# Patient Record
Sex: MALE | Race: BLACK OR AFRICAN AMERICAN | NOT HISPANIC OR LATINO | URBAN - METROPOLITAN AREA
[De-identification: names, ages, dates, MRNs, and addresses within clinical notes are randomized per-mention and may not be internally consistent; named-entity substitution may affect disease eponyms.]

---

## 2017-10-09 ENCOUNTER — INPATIENT (INPATIENT)
Facility: HOSPITAL | Age: 52
LOS: 0 days | Discharge: LEFT BEFORE DISCHARGE | DRG: 439 | End: 2017-10-10
Attending: INTERNAL MEDICINE | Admitting: INTERNAL MEDICINE
Payer: SELF-PAY

## 2017-10-09 VITALS
HEART RATE: 73 BPM | WEIGHT: 164.91 LBS | RESPIRATION RATE: 17 BRPM | HEIGHT: 70 IN | SYSTOLIC BLOOD PRESSURE: 131 MMHG | TEMPERATURE: 99 F | OXYGEN SATURATION: 98 % | DIASTOLIC BLOOD PRESSURE: 70 MMHG

## 2017-10-09 DIAGNOSIS — R74.8 ABNORMAL LEVELS OF OTHER SERUM ENZYMES: ICD-10-CM

## 2017-10-09 DIAGNOSIS — R79.89 OTHER SPECIFIED ABNORMAL FINDINGS OF BLOOD CHEMISTRY: ICD-10-CM

## 2017-10-09 DIAGNOSIS — Z98.890 OTHER SPECIFIED POSTPROCEDURAL STATES: Chronic | ICD-10-CM

## 2017-10-09 DIAGNOSIS — R10.13 EPIGASTRIC PAIN: ICD-10-CM

## 2017-10-09 DIAGNOSIS — E83.51 HYPOCALCEMIA: ICD-10-CM

## 2017-10-09 DIAGNOSIS — R63.8 OTHER SYMPTOMS AND SIGNS CONCERNING FOOD AND FLUID INTAKE: ICD-10-CM

## 2017-10-09 DIAGNOSIS — E13.22 OTHER SPECIFIED DIABETES MELLITUS WITH DIABETIC CHRONIC KIDNEY DISEASE: ICD-10-CM

## 2017-10-09 DIAGNOSIS — Z79.4 LONG TERM (CURRENT) USE OF INSULIN: ICD-10-CM

## 2017-10-09 DIAGNOSIS — Z29.9 ENCOUNTER FOR PROPHYLACTIC MEASURES, UNSPECIFIED: ICD-10-CM

## 2017-10-09 DIAGNOSIS — R00.1 BRADYCARDIA, UNSPECIFIED: ICD-10-CM

## 2017-10-09 DIAGNOSIS — D64.9 ANEMIA, UNSPECIFIED: ICD-10-CM

## 2017-10-09 DIAGNOSIS — K92.0 HEMATEMESIS: ICD-10-CM

## 2017-10-09 LAB
ALBUMIN SERPL ELPH-MCNC: 3.7 G/DL — SIGNIFICANT CHANGE UP (ref 3.3–5)
ALP SERPL-CCNC: 287 U/L — HIGH (ref 40–120)
ALT FLD-CCNC: 23 U/L — SIGNIFICANT CHANGE UP (ref 10–45)
AMYLASE P1 CFR SERPL: 89 U/L — SIGNIFICANT CHANGE UP (ref 25–125)
ANION GAP SERPL CALC-SCNC: 18 MMOL/L — HIGH (ref 5–17)
AST SERPL-CCNC: 27 U/L — SIGNIFICANT CHANGE UP (ref 10–40)
BILIRUB SERPL-MCNC: 0.2 MG/DL — SIGNIFICANT CHANGE UP (ref 0.2–1.2)
BUN SERPL-MCNC: 32 MG/DL — HIGH (ref 7–23)
CALCIUM SERPL-MCNC: 7.1 MG/DL — LOW (ref 8.4–10.5)
CHLORIDE SERPL-SCNC: 100 MMOL/L — SIGNIFICANT CHANGE UP (ref 96–108)
CK SERPL-CCNC: 577 U/L — HIGH (ref 30–200)
CO2 SERPL-SCNC: 17 MMOL/L — LOW (ref 22–31)
CREAT SERPL-MCNC: 5.15 MG/DL — HIGH (ref 0.5–1.3)
GLUCOSE SERPL-MCNC: 208 MG/DL — HIGH (ref 70–99)
HCT VFR BLD CALC: 25.7 % — LOW (ref 39–50)
HGB BLD-MCNC: 8.5 G/DL — LOW (ref 13–17)
LIDOCAIN IGE QN: 6 U/L — LOW (ref 7–60)
MCHC RBC-ENTMCNC: 28.3 PG — SIGNIFICANT CHANGE UP (ref 27–34)
MCHC RBC-ENTMCNC: 33.1 G/DL — SIGNIFICANT CHANGE UP (ref 32–36)
MCV RBC AUTO: 85.7 FL — SIGNIFICANT CHANGE UP (ref 80–100)
PHOSPHATE SERPL-MCNC: 5.7 MG/DL — HIGH (ref 2.5–4.5)
PLATELET # BLD AUTO: 151 K/UL — SIGNIFICANT CHANGE UP (ref 150–400)
POTASSIUM SERPL-MCNC: 5 MMOL/L — SIGNIFICANT CHANGE UP (ref 3.5–5.3)
POTASSIUM SERPL-SCNC: 5 MMOL/L — SIGNIFICANT CHANGE UP (ref 3.5–5.3)
PROT SERPL-MCNC: 7 G/DL — SIGNIFICANT CHANGE UP (ref 6–8.3)
RBC # BLD: 3 M/UL — LOW (ref 4.2–5.8)
RBC # FLD: 12.6 % — SIGNIFICANT CHANGE UP (ref 10.3–16.9)
SODIUM SERPL-SCNC: 135 MMOL/L — SIGNIFICANT CHANGE UP (ref 135–145)
WBC # BLD: 5.3 K/UL — SIGNIFICANT CHANGE UP (ref 3.8–10.5)
WBC # FLD AUTO: 5.3 K/UL — SIGNIFICANT CHANGE UP (ref 3.8–10.5)

## 2017-10-09 PROCEDURE — 71020: CPT | Mod: 26

## 2017-10-09 PROCEDURE — 93010 ELECTROCARDIOGRAM REPORT: CPT

## 2017-10-09 PROCEDURE — 99285 EMERGENCY DEPT VISIT HI MDM: CPT | Mod: 25

## 2017-10-09 PROCEDURE — 99223 1ST HOSP IP/OBS HIGH 75: CPT | Mod: GC

## 2017-10-09 PROCEDURE — 99252 IP/OBS CONSLTJ NEW/EST SF 35: CPT | Mod: GC

## 2017-10-09 PROCEDURE — 74176 CT ABD & PELVIS W/O CONTRAST: CPT | Mod: 26

## 2017-10-09 RX ORDER — CALCIUM CARBONATE 500(1250)
0 TABLET ORAL
Qty: 0 | Refills: 0 | COMMUNITY

## 2017-10-09 RX ORDER — SODIUM CHLORIDE 9 MG/ML
1000 INJECTION INTRAMUSCULAR; INTRAVENOUS; SUBCUTANEOUS ONCE
Qty: 0 | Refills: 0 | Status: COMPLETED | OUTPATIENT
Start: 2017-10-09 | End: 2017-10-09

## 2017-10-09 RX ORDER — SODIUM CHLORIDE 9 MG/ML
1000 INJECTION INTRAMUSCULAR; INTRAVENOUS; SUBCUTANEOUS
Qty: 0 | Refills: 0 | Status: DISCONTINUED | OUTPATIENT
Start: 2017-10-09 | End: 2017-10-09

## 2017-10-09 RX ORDER — INSULIN GLARGINE 100 [IU]/ML
0 INJECTION, SOLUTION SUBCUTANEOUS
Qty: 0 | Refills: 0 | COMMUNITY

## 2017-10-09 RX ORDER — SEVELAMER CARBONATE 2400 MG/1
800 POWDER, FOR SUSPENSION ORAL
Qty: 0 | Refills: 0 | Status: DISCONTINUED | OUTPATIENT
Start: 2017-10-09 | End: 2017-10-10

## 2017-10-09 RX ORDER — PANTOPRAZOLE SODIUM 20 MG/1
40 TABLET, DELAYED RELEASE ORAL EVERY 12 HOURS
Qty: 0 | Refills: 0 | Status: DISCONTINUED | OUTPATIENT
Start: 2017-10-09 | End: 2017-10-10

## 2017-10-09 RX ORDER — ACETAMINOPHEN 500 MG
1000 TABLET ORAL ONCE
Qty: 0 | Refills: 0 | Status: COMPLETED | OUTPATIENT
Start: 2017-10-09 | End: 2017-10-09

## 2017-10-09 RX ORDER — INSULIN LISPRO 100/ML
VIAL (ML) SUBCUTANEOUS
Qty: 0 | Refills: 0 | Status: DISCONTINUED | OUTPATIENT
Start: 2017-10-09 | End: 2017-10-10

## 2017-10-09 RX ORDER — INSULIN GLARGINE 100 [IU]/ML
5 INJECTION, SOLUTION SUBCUTANEOUS AT BEDTIME
Qty: 0 | Refills: 0 | Status: DISCONTINUED | OUTPATIENT
Start: 2017-10-09 | End: 2017-10-10

## 2017-10-09 RX ORDER — LIPASE/PROTEASE/AMYLASE 16-48-48K
3 CAPSULE,DELAYED RELEASE (ENTERIC COATED) ORAL
Qty: 0 | Refills: 0 | Status: DISCONTINUED | OUTPATIENT
Start: 2017-10-09 | End: 2017-10-10

## 2017-10-09 RX ORDER — INSULIN GLARGINE 100 [IU]/ML
12 INJECTION, SOLUTION SUBCUTANEOUS
Qty: 0 | Refills: 0 | COMMUNITY

## 2017-10-09 RX ORDER — SODIUM CHLORIDE 9 MG/ML
1000 INJECTION, SOLUTION INTRAVENOUS
Qty: 0 | Refills: 0 | Status: DISCONTINUED | OUTPATIENT
Start: 2017-10-09 | End: 2017-10-10

## 2017-10-09 RX ORDER — MAGNESIUM SULFATE 500 MG/ML
1 VIAL (ML) INJECTION ONCE
Qty: 0 | Refills: 0 | Status: COMPLETED | OUTPATIENT
Start: 2017-10-09 | End: 2017-10-09

## 2017-10-09 RX ORDER — THIAMINE MONONITRATE (VIT B1) 100 MG
100 TABLET ORAL ONCE
Qty: 0 | Refills: 0 | Status: DISCONTINUED | OUTPATIENT
Start: 2017-10-09 | End: 2017-10-09

## 2017-10-09 RX ORDER — DEXTROSE 50 % IN WATER 50 %
25 SYRINGE (ML) INTRAVENOUS ONCE
Qty: 0 | Refills: 0 | Status: DISCONTINUED | OUTPATIENT
Start: 2017-10-09 | End: 2017-10-10

## 2017-10-09 RX ORDER — GLUCAGON INJECTION, SOLUTION 0.5 MG/.1ML
1 INJECTION, SOLUTION SUBCUTANEOUS ONCE
Qty: 0 | Refills: 0 | Status: DISCONTINUED | OUTPATIENT
Start: 2017-10-09 | End: 2017-10-10

## 2017-10-09 RX ORDER — CHOLECALCIFEROL (VITAMIN D3) 125 MCG
1000 CAPSULE ORAL DAILY
Qty: 0 | Refills: 0 | Status: DISCONTINUED | OUTPATIENT
Start: 2017-10-09 | End: 2017-10-10

## 2017-10-09 RX ORDER — HYDROMORPHONE HYDROCHLORIDE 2 MG/ML
0.5 INJECTION INTRAMUSCULAR; INTRAVENOUS; SUBCUTANEOUS ONCE
Qty: 0 | Refills: 0 | Status: DISCONTINUED | OUTPATIENT
Start: 2017-10-09 | End: 2017-10-09

## 2017-10-09 RX ORDER — INSULIN ASPART 100 [IU]/ML
0 INJECTION, SOLUTION SUBCUTANEOUS
Qty: 0 | Refills: 0 | COMMUNITY

## 2017-10-09 RX ORDER — THIAMINE MONONITRATE (VIT B1) 100 MG
100 TABLET ORAL ONCE
Qty: 0 | Refills: 0 | Status: COMPLETED | OUTPATIENT
Start: 2017-10-09 | End: 2017-10-10

## 2017-10-09 RX ORDER — SODIUM CHLORIDE 9 MG/ML
500 INJECTION INTRAMUSCULAR; INTRAVENOUS; SUBCUTANEOUS ONCE
Qty: 0 | Refills: 0 | Status: DISCONTINUED | OUTPATIENT
Start: 2017-10-09 | End: 2017-10-09

## 2017-10-09 RX ORDER — ONDANSETRON 8 MG/1
4 TABLET, FILM COATED ORAL ONCE
Qty: 0 | Refills: 0 | Status: COMPLETED | OUTPATIENT
Start: 2017-10-09 | End: 2017-10-09

## 2017-10-09 RX ORDER — CALCIUM GLUCONATE 100 MG/ML
1 VIAL (ML) INTRAVENOUS ONCE
Qty: 0 | Refills: 0 | Status: COMPLETED | OUTPATIENT
Start: 2017-10-09 | End: 2017-10-09

## 2017-10-09 RX ORDER — FOLIC ACID 0.8 MG
1 TABLET ORAL DAILY
Qty: 0 | Refills: 0 | Status: DISCONTINUED | OUTPATIENT
Start: 2017-10-09 | End: 2017-10-10

## 2017-10-09 RX ORDER — DEXTROSE 50 % IN WATER 50 %
1 SYRINGE (ML) INTRAVENOUS ONCE
Qty: 0 | Refills: 0 | Status: DISCONTINUED | OUTPATIENT
Start: 2017-10-09 | End: 2017-10-10

## 2017-10-09 RX ORDER — CALCIUM CARBONATE 500(1250)
1 TABLET ORAL DAILY
Qty: 0 | Refills: 0 | Status: DISCONTINUED | OUTPATIENT
Start: 2017-10-09 | End: 2017-10-10

## 2017-10-09 RX ORDER — ACETAMINOPHEN 500 MG
650 TABLET ORAL EVERY 6 HOURS
Qty: 0 | Refills: 0 | Status: DISCONTINUED | OUTPATIENT
Start: 2017-10-09 | End: 2017-10-10

## 2017-10-09 RX ORDER — DIPHENHYDRAMINE HCL 50 MG
25 CAPSULE ORAL ONCE
Qty: 0 | Refills: 0 | Status: COMPLETED | OUTPATIENT
Start: 2017-10-09 | End: 2017-10-09

## 2017-10-09 RX ORDER — DEXTROSE 50 % IN WATER 50 %
12.5 SYRINGE (ML) INTRAVENOUS ONCE
Qty: 0 | Refills: 0 | Status: DISCONTINUED | OUTPATIENT
Start: 2017-10-09 | End: 2017-10-10

## 2017-10-09 RX ORDER — CHOLECALCIFEROL (VITAMIN D3) 125 MCG
0 CAPSULE ORAL
Qty: 0 | Refills: 0 | COMMUNITY

## 2017-10-09 RX ORDER — HYDROMORPHONE HYDROCHLORIDE 2 MG/ML
4 INJECTION INTRAMUSCULAR; INTRAVENOUS; SUBCUTANEOUS ONCE
Qty: 0 | Refills: 0 | Status: DISCONTINUED | OUTPATIENT
Start: 2017-10-09 | End: 2017-10-09

## 2017-10-09 RX ADMIN — HYDROMORPHONE HYDROCHLORIDE 0.5 MILLIGRAM(S): 2 INJECTION INTRAMUSCULAR; INTRAVENOUS; SUBCUTANEOUS at 15:03

## 2017-10-09 RX ADMIN — SODIUM CHLORIDE 250 MILLILITER(S): 9 INJECTION, SOLUTION INTRAVENOUS at 23:40

## 2017-10-09 RX ADMIN — Medication 200 GRAM(S): at 18:30

## 2017-10-09 RX ADMIN — SODIUM CHLORIDE 1000 MILLILITER(S): 9 INJECTION INTRAMUSCULAR; INTRAVENOUS; SUBCUTANEOUS at 14:52

## 2017-10-09 RX ADMIN — PANTOPRAZOLE SODIUM 40 MILLIGRAM(S): 20 TABLET, DELAYED RELEASE ORAL at 23:38

## 2017-10-09 RX ADMIN — Medication 100 GRAM(S): at 23:39

## 2017-10-09 RX ADMIN — HYDROMORPHONE HYDROCHLORIDE 0.5 MILLIGRAM(S): 2 INJECTION INTRAMUSCULAR; INTRAVENOUS; SUBCUTANEOUS at 18:30

## 2017-10-09 RX ADMIN — ONDANSETRON 4 MILLIGRAM(S): 8 TABLET, FILM COATED ORAL at 14:51

## 2017-10-09 RX ADMIN — Medication 25 MILLIGRAM(S): at 15:09

## 2017-10-09 NOTE — H&P ADULT - ASSESSMENT
53 yo M PMH chronic pancreatitis s/p Whipple 2009 for pseudocysts, DM on insulin, hypoparathyroidism s/p parathyroid sx 8/17, CKD p/w abd pain, vomiting, and pain in his LE with hypocalcemia in the setting of recent ETOH use and recent parathyroid surgery.

## 2017-10-09 NOTE — H&P ADULT - NSHPLABSRESULTS_GEN_ALL_CORE
8.5    5.3   )-----------( 151      ( 09 Oct 2017 15:49 )             25.7   10-09    135  |  100  |  32<H>  ----------------------------<  208<H>  5.0   |  17<L>  |  5.15<H>    Ca    7.1<L>      09 Oct 2017 15:49  Phos  5.7     10-09  Mg     1.9     10-09    TPro  7.0  /  Alb  3.7  /  TBili  0.2  /  DBili  x   /  AST  27  /  ALT  23  /  AlkPhos  287<H>  10-09    Lipid Profile (10.09.17 @ 15:49)    HDL/Total Cholesterol Ratio Measurement: 2.5 RATIO    Cholesterol, Serum: 108 mg/dL    Triglycerides, Serum: 70 mg/dL    HDL Cholesterol, Serum: 44 mg/dL    Direct LDL: 50: LDL Cholesterol --- Interpretive Comment (for adults 18 and over)  Below 70                  Ideal for people at very high risk of heart  disease  Below 100                Ideal for people at risk of heart disease  100 - 129                  [place]  130 - 159                  Borderline high  160 - 189                  High  190 and Above        Very high mg/dL    Lipase, Serum (10.09.17 @ 15:49)    Lipase, Serum: 6 U/L  Amylase, Serum Total (10.09.17 @ 15:49)    Amylase, Serum Total: 89 U/L    CT a/p noncont 10/9  IMPRESSION:  1.  Chronic calcific pancreatitis with pancreatic ductal dilatation. No   visualized pancreatic pseudocyst or intra /extrapancreatic fluid   collections. 8.5    5.3   )-----------( 151      ( 09 Oct 2017 15:49 )             25.7   10-09    135  |  100  |  32<H>  ----------------------------<  208<H>  5.0   |  17<L>  |  5.15<H>    Ca    7.1<L>      09 Oct 2017 15:49  Phos  5.7     10-09  Mg     1.9     10-09    TPro  7.0  /  Alb  3.7  /  TBili  0.2  /  DBili  x   /  AST  27  /  ALT  23  /  AlkPhos  287<H>  10-09    Lipid Profile (10.09.17 @ 15:49)    HDL/Total Cholesterol Ratio Measurement: 2.5 RATIO    Cholesterol, Serum: 108 mg/dL    Triglycerides, Serum: 70 mg/dL    HDL Cholesterol, Serum: 44 mg/dL    Direct LDL: 50: LDL Cholesterol --- Interpretive Comment (for adults 18 and over)  Below 70                  Ideal for people at very high risk of heart  disease  Below 100                Ideal for people at risk of heart disease  100 - 129                  [place]  130 - 159                  Borderline high  160 - 189                  High  190 and Above        Very high mg/dL    Lipase, Serum (10.09.17 @ 15:49)    Lipase, Serum: 6 U/L  Amylase, Serum Total (10.09.17 @ 15:49)    Amylase, Serum Total: 89 U/L    CXR clear  EKG sinus victoria    CT a/p noncont 10/9  IMPRESSION:  1.  Chronic calcific pancreatitis with pancreatic ductal dilatation. No   visualized pancreatic pseudocyst or intra /extrapancreatic fluid   collections.

## 2017-10-09 NOTE — CONSULT NOTE ADULT - ASSESSMENT
Assessment/Plan                  Case discussed with Dr. Hernandez and updated primary team. Assessment/Plan    Patient is a 52y old  Male with Hx of chronic pancreatitis(s/p Whipple), hyperparathyroidism (s/p parathyroidectomy 8/2017) who presents with a chief complaint of abdominal pain started today after drinking 2 beers yesterday    Problem/recommendations:  1-Abdominal pain:  -Pt's abdominal pain started after his alcohol intake and epigastric mainly, could be alcohol gastritis. Start PPI  -Pt also has periumbilical abd.pain, but his abdomen is soft. r/o ischemic colitis. Send lactate  -Give IV tylenol PRN. Escalate pain meds as tolerated  -Send for U.Tox   -zofran as needed    2-Hypocalcemia:  -Ca is 7.1 which could be due to his chronic pancreatitis. Corrected Ca for albumin is 7.3.  -Pt received one gm of Ca gluconate in ER. F/U repeat Ca.  -Pt is on Ca carbonate as outpatient    3-Chronic pancreatitis:  -Lipase is 6 (very low)-Send for amylase  -Supportive care with IVF (double maintenance: 200 cc/hr)    4-Renal failure with anion gap metabolic acidosis:  -Unknown if chronic or acute. No previous admission and pt has no PCP. Last time was 1 yr ago. Plz get collaterals   -Consider nephrology consult  -Pt urinates normally, send for UA-lytes    5-Anemia:  -Possibly due to his underlying kidney disease. Normocytic anemia(anemia of chronic disease)  -send for anemia panel. retic count     6- Hyperglycemia:  ISS and adjust insulin dose when pt starts eating    Dispo: Pt can be managed on regular floor and no need for stepdown(telemetry) or ICU admission. Pt's mild hypocalcemia has no EKG findings- no need for telemonitor        Case discussed with Dr. Hernandez and he agreed to the plan

## 2017-10-09 NOTE — ED PROVIDER NOTE - PROGRESS NOTE DETAILS
Pt refused PO meds and requests IV dilaudid, stating this is what helps with his pain. Upon administration, pt's arm became red at which point he stated "this always happens, now I need 25mg benadryl." This behavior raises concern for malingering/drug seeking. Lab work delayed as pt with difficult access requiring arterial stick. Labs reviewed, no prior to compare - renal failure, hypocalcemic. Calcium repletion ordered. At CT now. Labs reviewed, no prior to compare - anemic, renal compromise, hypocalcemic. Calcium repletion ordered. At CT now. Labs reviewed, no prior to compare - anemic, renal compromise (states his baseline Cr 2), hypocalcemic. Calcium repletion, EKG ordered. At CT now. Labs reviewed, no prior to compare - anemic, renal compromise (states his baseline Cr 2), hypocalcemic. Calcium repletion, EKG ordered. Transferred to north side for monitor. At CT now. ICU consulted. Pt cleared for admission to floor by ICU team.

## 2017-10-09 NOTE — H&P ADULT - HISTORY OF PRESENT ILLNESS
53 yo M PMH chronic pancreatitis s/p Whipple 2009 for pseudocysts, DM on insulin, hypoparathyroidism s/p parathyroid sx 8/17, CKD p/w abd pain, vomiting, and pain in his LE.  Pt was sleeping when arrived at bedside and abruptly became belligerent upon asking questions, not wanting to answer questions and stating that staff has been ignoring him while he is in pain; therefore history and exam are limited.  Some history obtained per ICU chart note.  States the abd pain started yesterday, throbbing, radiating to back, constant.  Around the same time, started vomiting x 5, yellow material.  Did not note blood initially when he began vomiting, but during subsequent episodes did not blood.  Prior to pain and vomiting, drank 3 beers the day before.  Smokes 3 cig/d.   Had been having LE pain due to muscle spasms and tingling in his fingers and toes since his parathyroid surgery in August 2017, states 1 parathyroid gland was left.  Has been taking vit D and Ca supp daily since then.  Also stated he is in kidney failure, but still urinates, amount unchanged.    In the ED, VSS.  Labs significant for bicarb 17, AG 18, BUN/Cre 32/5.15.  CT a/p showed chronic pancreatitis with pancreatic duct dilation.  EKG showing sinus victoria, QTC wnl.  Given 1 g Cagluconate, after which pt states the bag was "so little", and he is still having tingling.  Given 0.5 IV hydromorphone, after which IV site started itching, so pt requested benadryl per ED attg.  Also given 1 L NS x 2, zofran 4 mg IV x 1, and tylenol 1 g IV x 1.  ICU consulted due to symptomatic hypocalcemia; pt deemed appropriate for GMF given stable EKG.  Admitted for pain control, hypocalcemia symptoms. 53 yo M PMH chronic pancreatitis s/p Whipple 2009 for pseudocysts, DM on insulin, hypoparathyroidism s/p parathyroid sx 8/17, CKD p/w abd pain, vomiting, and pain in his LE.  Pt was sleeping when arrived at bedside and abruptly became belligerent upon asking questions, not wanting to answer questions and stating that staff has been ignoring him while he is in pain; therefore history and exam are limited.  Some history obtained per ICU chart note.  States the abd pain started yesterday, throbbing, radiating to back, constant.  Around the same time, started vomiting x 5, yellow material.  Did not note blood initially when he began vomiting, but during subsequent episodes did not blood.  Per ICU, also has been having nonbloody diarrhea.  Prior to pain and vomiting, drank 3 beers the day before.  Smokes 3 cig/d.   Had been having LE pain due to muscle spasms and tingling in his fingers and toes since his parathyroid surgery in August 2017, states 1 parathyroid gland was left.  Has been taking vit D and Ca supp daily since then.  Also stated he is in kidney failure, but still urinates, amount unchanged.    In the ED, VSS.  Labs significant for bicarb 17, AG 18, BUN/Cre 32/5.15.  CT a/p showed chronic pancreatitis with pancreatic duct dilation.  EKG showing sinus victoria, QTC wnl.  Given 1 g Cagluconate, after which pt states the bag was "so little", and he is still having tingling.  Given 0.5 IV hydromorphone, after which IV site started itching, so pt requested benadryl per ED attg.  Also given 1 L NS x 2, zofran 4 mg IV x 1, and tylenol 1 g IV x 1.  ED did not report vomiting during adm.  ICU consulted due to symptomatic hypocalcemia; pt deemed appropriate for GMF given stable EKG.  Admitted for pain control, hypocalcemia symptoms.

## 2017-10-09 NOTE — ED ADULT NURSE NOTE - OBJECTIVE STATEMENT
Pt came to ED complaining of abd pain, Nausea, vomiting, diarrhea starting today. Pt reports hx of pancreatitis since 2006.  Pt denies chest pain, SOB,  symptoms.  Reports vomiting 3 times today.  Reports sharp 10/10 pain.  PT denies all other medical complaints at this time. Pt is alert and oriented x3, ambulatory with even gait.

## 2017-10-09 NOTE — ED PROVIDER NOTE - CARE PLAN
Principal Discharge DX:	Hypocalcemia  Secondary Diagnosis:	Pancreatitis  Secondary Diagnosis:	Renal failure

## 2017-10-09 NOTE — ED ADULT TRIAGE NOTE - OTHER COMPLAINTS
pt c.o abd pain with n/v since this am. hx pancreatitis, stage 4 renal failure. pt also admits to tingling to fingers. denies cp, no sob. no recent fever/chills. no urinary symptoms.

## 2017-10-09 NOTE — H&P ADULT - PROBLEM SELECTOR PLAN 7
likely 2/2 renal failure vs. hematemesis vs. EToH use  -check Fe studies, folate, b12, retic %, peripheral smear

## 2017-10-09 NOTE — H&P ADULT - PROBLEM SELECTOR PLAN 4
likely moreno-sunshine tear due to repeated vomiting vs. peptic ulcer ; no signs of active bleeding  -will trend hb; transfuse if Hb X 7  -c/w PPI 40 IV bid  -FOBT

## 2017-10-09 NOTE — ED PROVIDER NOTE - MEDICAL DECISION MAKING DETAILS
52y male with epigastric pain, N/V/D c/w prior episodes of pancreatitis. VSS, sleeping prior to evaluation, exam as noted, non-surgical abdomen. Plan for labs, IVF, zofran, pain control, reassess.

## 2017-10-09 NOTE — H&P ADULT - PROBLEM SELECTOR PROBLEM 8
Other specified diabetes mellitus with chronic kidney disease, with long-term current use of insulin

## 2017-10-09 NOTE — H&P ADULT - PROBLEM SELECTOR PLAN 3
Cre elevated from baseline per pt of 2.  ALso with AG 18, bicarb 12.  still urinating.  kidney's appeared normal on CT a/p.  BP controlled.  may be poorly controlled diabetic causing CKD.  likely dehydrated per history  -will check urine studies, UA, lactate  -IVF  -abd US

## 2017-10-09 NOTE — ED PROVIDER NOTE - CPE EDP CARDIAC NORM
normal... INTERVAL HPI/OVERNIGHT EVENTS:    VITAL SIGNS:  T(F): 98.4 (10-04-17 @ 18:16)  HR: 75 (10-04-17 @ 18:16)  BP: 135/94 (10-04-17 @ 18:16)  RR: 16 (10-04-17 @ 18:16)  SpO2: 95% (10-04-17 @ 18:16)  Wt(kg): --    PHYSICAL EXAM:    Constitutional: well-appearing, well-developed, mildly flushed  Eyes: PERRL, EOMI  ENMT: mildly dry MM, no oral lesions, no cervical LAD; no tongue fasciculations  Respiratory: CTAb/l, no wheezes/rales/rhochi  Cardiovascular: RRR, normal S1/S2, no murmurs/rubs/gallops  Gastrointestinal: soft, NTND, normal BS, small palpable nodule on the left, firm, mobile  Extremities: no edema or cyanosis; right foot mild swelling/erythema, small ulcer on right 5th digit with small amount of blood, no gross purulence (s/p drainage w/ podiatry)  Vascular: WWP, DP 2+ b/l  Neurological: AOx3, responds to all commands, CNII-XII grossly intact, no hand tremor  Musculoskeletal: no joint swelling or tenderness

## 2017-10-09 NOTE — ED PROVIDER NOTE - GASTROINTESTINAL, MLM
soft. voluntary guarding. wincing with placement of stethoscope. mild diffuse ttp that is distractable. normal bowel sounds. vertical surgical scar upper abd.

## 2017-10-09 NOTE — H&P ADULT - ATTENDING COMMENTS
Pt seen and examined at bedside on 10/9 @ 2000    Agree with HPI, ROS as above.     VS, Labs, FH, SH, allergies, medications, imaging reviewed. I personally reviewed the EKG - . Agree with physical exam as above.    A/P: 53 yo M PMH chronic pancreatitis s/p Whipple 2009 for pseudocysts, DM on insulin, hypoparathyroidism s/p parathyroid sx 8/17, CKD p/w abd pain, vomiting, and pain in his LE with hypocalcemia in the setting of recent ETOH use and recent parathyroid surgery.    **Epigastric pain  -    **Hypocalcemia  -    **Acute Renal Failure  -    Plan otherwise as outlined above.... Pt seen and examined at bedside on 10/9 @ 2300    Agree with HPI, ROS as above. Patient refusing to answer questions - stating that he's frustrated because nobody is listening to him and that he did not receive enough Dilaudid in ED. Reports that he wants to be left alone to sleep. On repeat questioning reports 11/10 pain in his abdomen that he reports is present whenever he gets pancreatitis although has not had medical care since [facility] closed.     VS, Labs, FH, SH, allergies, medications, imaging reviewed. I personally reviewed the EKG - . Agree with physical exam as above with the following addition:   Abd - soft, non distended, + BS in all 4 quadrants, large midline scar, TTP diffusely although lessened when patient is distracted    A/P: 53 yo M PMH chronic pancreatitis s/p Whipple 2009 for pseudocysts, DM on insulin, hypoparathyroidism s/p parathyroid sx 8/17, CKD p/w abd pain, vomiting, and pain in his LE with hypocalcemia in the setting of recent ETOH use and recent parathyroid surgery.    **Epigastric pain  -Chronic pancreatitis vs Viral gastroenteritis or PUD  -Has not had vomiting or diarrhea since in the hospital  -Can c/w Protonix, IVF, pain control (with oral pain medications when tolerating)  -Advance diet as tolerated  -Lipase - 6  -CT a/p with no obstruction or free air, although showing chronic pancreatitis and pancreatitic duct dilatation  -Creon  -GI consult in AM    **Hypocalcemia  -QTc within normal limits  -Pt s/p IV calcium  -C/w calcium carbonate  -Trend with AM BMP  -Repeat EKG in AM    **Acute Renal Failure  -Unclear baseline  -Check urine electrolytes, ultrasound  -FC - for strict i/o's although patient refusing at this time  -Renal consult in AM    Plan otherwise as outlined above....    Addendum:  Pt refusing multiple interventions overnight including vital signs check, IVF, lab draws. Discussed need for these interventions with patient but still refusing. Only requests IV Dilaudid Pt seen and examined at bedside on 10/9 @ 2300    Agree with HPI, ROS as above. Patient refusing to answer questions - stating that he's frustrated because nobody is listening to him and that he did not receive enough Dilaudid in ED. Reports that he wants to be left alone to sleep. On repeat questioning reports 11/10 pain in his abdomen that he reports is present whenever he gets pancreatitis although has not had medical care since [facility] closed.     VS, Labs, FH, SH, allergies, medications, imaging reviewed. I personally reviewed the EKG - sinus bradycardia. Agree with physical exam as above with the following addition:   Abd - soft, non distended, + BS in all 4 quadrants, large midline scar, TTP diffusely although lessened when patient is distracted    A/P: 53 yo M PMH chronic pancreatitis s/p Whipple 2009 for pseudocysts, DM on insulin, hypoparathyroidism s/p parathyroid sx 8/17, CKD p/w abd pain, vomiting, and pain in his LE with hypocalcemia in the setting of recent ETOH use and recent parathyroid surgery.    **Epigastric pain  -Chronic pancreatitis vs Viral gastroenteritis or PUD  -Has not had vomiting or diarrhea since in the hospital  -Can c/w Protonix, IVF, pain control (with oral pain medications when tolerating)  -Advance diet as tolerated  -Lipase - 6  -CT a/p with no obstruction or free air, although showing chronic pancreatitis and pancreatitic duct dilatation  -Creon  -GI consult in AM    **Hypocalcemia  -QTc within normal limits  -Pt s/p IV calcium  -C/w calcium carbonate  -Trend with AM BMP  -Repeat EKG in AM    **Acute Renal Failure  -Unclear baseline  -Check urine electrolytes, ultrasound  -FC - for strict i/o's although patient refusing at this time  -Renal consult in AM    Plan otherwise as outlined above....    Addendum:  Pt refusing multiple interventions overnight including vital signs check, IVF, lab draws. Discussed need for these interventions with patient but still refusing. Only requests IV Dilaudid

## 2017-10-09 NOTE — H&P ADULT - NSHPPHYSICALEXAM_GEN_ALL_CORE
Gen:  Sleeping on arrival to bedside, then belligerent, stating no one is helping him  HEENT: MMM  CV:  RRR no murmurs  LUngs:  limited exam, anteriorly clear  Abdomen:  pt grimacing when barely palpating epigastric region, BS slightly hyperactive, nondistended, soft  Ext:  no edema

## 2017-10-09 NOTE — ED PROVIDER NOTE - OBJECTIVE STATEMENT
52y male h/o chronic pancreatitis (last episode 3-4m ago), s/p Whipple procedure '09 for pseudocysts, DM, hypocalcemia, c/o epigastric pain radiating to his back that began this morning a/w nausea, vomiting, watery diarrhea. No fevers. Admits to excess EtOH intake last night. Feels c/w typical episodes of pancreatitis. No chest pain, trouble breathing, or urinary complaints. 52y male h/o chronic pancreatitis (last episode 3-4m ago), s/p Whipple procedure '09 for pseudocysts, DM, hypocalcemia, c/o epigastric pain radiating to his back that began this morning a/w nausea, vomiting, watery diarrhea. No fevers. Admits to excess EtOH intake last night. Feels c/w typical episodes of pancreatitis. No chest pain, trouble breathing, or urinary complaints. Also reports myalgias, paresthesias to hands/mouth c/w prior episodes of hypocalcemia.

## 2017-10-09 NOTE — CONSULT NOTE ADULT - SUBJECTIVE AND OBJECTIVE BOX
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  Eyes: No eye pain, visual disturbances, or discharge  ENMT:  No difficulty hearing, tinnitus, vertigo; No sinus or throat pain  Neck: No pain or stiffness  Respiratory: No cough, wheezing, chills or hemoptysis  Cardiovascular: No chest pain, palpitations, shortness of breath, dizziness or leg swelling  Gastrointestinal: No abdominal or epigastric pain. no nausea, vomiting or hematemesis;  no diarrhea or constipation. No melena or hematochezia.  Genitourinary: No dysuria, frequency, hematuria or incontinence    VITAL SIGNS:  ICU Vital Signs Last 24 Hrs  T(C): 37 (09 Oct 2017 13:02), Max: 37 (09 Oct 2017 13:02)  T(F): 98.6 (09 Oct 2017 13:02), Max: 98.6 (09 Oct 2017 13:02)  HR: 73 (09 Oct 2017 13:02) (73 - 73)  BP: 131/70 (09 Oct 2017 13:02) (131/70 - 131/70)  BP(mean): --  ABP: --  ABP(mean): --  RR: 17 (09 Oct 2017 13:02) (17 - 17)  SpO2: 98% (09 Oct 2017 13:02) (98% - 98%)      CAPILLARY BLOOD GLUCOSE          PHYSICAL EXAM   General: NAD, comfortable, AOx3  HEENT: NCAT, PERRL, clear conjunctiva, no scleral icterus  Neck: supple, no JVD  Respiratory: CTA b/l, no wheezing, rhonchi, rales  Cardiovascular: RRR, normal S1S2, no M/R/G  Abdomen: soft, NT/ND, bowel sounds normoactive throughout, no palpable masses  Extremities: WWP, no clubbing, cyanosis, or edema  Neuro -  - Mental Status:  Alert, awake, oriented to person, place, and time; Speech is fluent with intact naming, repetition, and comprehension      LABS                        8.5    5.3   )-----------( 151      ( 09 Oct 2017 15:49 )             25.7     10-09    135  |  100  |  32<H>  ----------------------------<  208<H>  5.0   |  17<L>  |  5.15<H>    Ca    7.1<L>      09 Oct 2017 15:49  Mg     1.9     10-09    TPro  7.0  /  Alb  3.7  /  TBili  0.2  /  DBili  x   /  AST  27  /  ALT  23  /  AlkPhos  287<H>  10-09            IMAGING  CXR -   EKG - ICU CONSULT    Patient is a 52y old  Male with Hx of pancreatitis(s/p Whipple), hyperparathyroidism (s/p parathyroidectomy 8/2017) who presents with a chief complaint of abdominal pain started today. Pt drank 2 beers yesterday and woke up today with sharp epigastric abdominal pain 9/10 radiating to the back, constant, associated with nausea,vomiting and diarrhea. Pt stated that he had 3 episodes of hematemesis(unsure of dark or fresh blood) but denied any dizziness or LOC. He had 2 episodes of watery non bloody diarrhea. He mentioned that he has numbness, tingling around his hands, mouth and feet but denied any seizures. He denied any headache,CP, SOB, dysuria, hematuria, hematochezia. 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  Eyes: No eye pain, visual disturbances, or discharge  ENMT:  No difficulty hearing, tinnitus, vertigo; No sinus or throat pain  Neck: No pain or stiffness  Respiratory: No cough, wheezing, chills or hemoptysis  Cardiovascular: No chest pain, palpitations, shortness of breath, dizziness or leg swelling  Gastrointestinal: +abdominal or epigastric pain. +nausea, vomiting, hematemesis;  + diarrhea or constipation. No melena or hematochezia.  Genitourinary: No dysuria, frequency, hematuria or incontinence    VITAL SIGNS:  ICU Vital Signs Last 24 Hrs  T(C): 37 (09 Oct 2017 13:02), Max: 37 (09 Oct 2017 13:02)  T(F): 98.6 (09 Oct 2017 13:02), Max: 98.6 (09 Oct 2017 13:02)  HR: 73 (09 Oct 2017 13:02) (73 - 73)  BP: 131/70 (09 Oct 2017 13:02) (131/70 - 131/70)  BP(mean): --  ABP: --  ABP(mean): --  RR: 17 (09 Oct 2017 13:02) (17 - 17)  SpO2: 98% (09 Oct 2017 13:02) (98% - 98%)      CAPILLARY BLOOD GLUCOSE          PHYSICAL EXAM   General: NAD, comfortable, AOx3  HEENT: NCAT, PERRL, clear conjunctiva, no scleral icterus  Neck: supple, no JVD  Respiratory: CTA b/l, no wheezing, rhonchi, rales  Cardiovascular: RRR, normal S1S2, no M/R/G  Abdomen: soft, NT/ND, bowel sounds normoactive throughout, no palpable masses  Extremities: WWP, no clubbing, cyanosis, or edema  Neuro -  - Mental Status:  Alert, awake, oriented to person, place, and time; Speech is fluent with intact naming, repetition, and comprehension      LABS                        8.5    5.3   )-----------( 151      ( 09 Oct 2017 15:49 )             25.7     10-09    135  |  100  |  32<H>  ----------------------------<  208<H>  5.0   |  17<L>  |  5.15<H>    Ca    7.1<L>      09 Oct 2017 15:49  Mg     1.9     10-09    TPro  7.0  /  Alb  3.7  /  TBili  0.2  /  DBili  x   /  AST  27  /  ALT  23  /  AlkPhos  287<H>  10-09            IMAGING  CXR -   EKG - ICU CONSULT    Patient is a 52y old  Male with Hx of chronic pancreatitis(s/p Whipple), hyperparathyroidism (s/p parathyroidectomy 8/2017) who presents with a chief complaint of abdominal pain started today. Pt drank 2 beers yesterday and woke up today with sharp epigastric abdominal pain 9/10 radiating to the back, constant, associated with nausea,vomiting and diarrhea. Pt stated that he had 3 episodes of hematemesis(unsure of dark or fresh blood) but denied any dizziness or LOC. He had 2 episodes of watery non bloody diarrhea. He mentioned that he has numbness, tingling around his hands, mouth and feet but denied any seizures. Pt urinates normally. He denied any headache,CP, SOB, dysuria, hematuria, hematochezia. Last time he saw a PCP was 1 yr ago because he changed his insurance    PMHx:  PAST MEDICAL & SURGICAL HISTORY:  As above      Home medications:  Pt doesn't have the list and has no pharmacy  He stated he takes Ca carbonate-Vitamin D3-Insulin and dilaudid. When asked about the dosages, he didn't answer. 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  Eyes: No eye pain, visual disturbances, or discharge  ENMT:  No difficulty hearing, tinnitus, vertigo; No sinus or throat pain  Neck: No pain or stiffness  Respiratory: No cough, wheezing, chills or hemoptysis  Cardiovascular: No chest pain, palpitations, shortness of breath, dizziness or leg swelling  Gastrointestinal: +abdominal or epigastric pain. +nausea, vomiting, hematemesis;  + diarrhea or constipation. No melena or hematochezia.  Genitourinary: No dysuria, frequency, hematuria or incontinence    VITAL SIGNS:  ICU Vital Signs Last 24 Hrs  T(C): 37 (09 Oct 2017 13:02), Max: 37 (09 Oct 2017 13:02)  T(F): 98.6 (09 Oct 2017 13:02), Max: 98.6 (09 Oct 2017 13:02)  HR: 73 (09 Oct 2017 13:02) (73 - 73)  BP: 131/70 (09 Oct 2017 13:02) (131/70 - 131/70)  BP(mean): --  ABP: --  ABP(mean): --  RR: 17 (09 Oct 2017 13:02) (17 - 17)  SpO2: 98% (09 Oct 2017 13:02) (98% - 98%)      CAPILLARY BLOOD GLUCOSE          PHYSICAL EXAM   General: NAD, comfortable, AOx3  HEENT: NCAT, PERRL, clear conjunctiva, no scleral icterus, moist mucosa  Neck: supple, no JVD  Respiratory: CTA b/l, no wheezing, rhonchi, rales  Cardiovascular: RRR, normal S1S2, no M/R/G  Abdomen: soft, epigastric/perumbilical tenderness, bowel sounds normoactive throughout, no palpable masses, surgical scar   Extremities: WWP, no clubbing, cyanosis, or edema  Neuro -  - Mental Status:  Alert, awake, oriented to person, place, and time; Speech is fluent with intact naming, repetition, and comprehension      LABS                        8.5    5.3   )-----------( 151      ( 09 Oct 2017 15:49 )             25.7     10-09    135  |  100  |  32<H>  ----------------------------<  208<H>  5.0   |  17<L>  |  5.15<H>    Ca    7.1<L>      09 Oct 2017 15:49  Mg     1.9     10-09    TPro  7.0  /  Alb  3.7  /  TBili  0.2  /  DBili  x   /  AST  27  /  ALT  23  /  AlkPhos  287<H>  10-09        IMAGING  CT abd/pelvis   < from: CT Abdomen and Pelvis No Cont (10.09.17 @ 17:07) >  IMPRESSION:  1.  Chronic calcific pancreatitis with pancreatic ductal dilatation. No   visualized pancreatic pseudocyst or intra /extrapancreatic fluid   collections.    EKG - No QT prolongation

## 2017-10-09 NOTE — H&P ADULT - PROBLEM SELECTOR PLAN 1
multiple etiologies possible - peptic ulcer (smoker, ETOH use, epigastic pain), gastroenteritis (given h/o vomiting and diarrhea), vs. acute on chronic pancreatitis (given epigastric pain radiating to back; unable to mount pancreatic enzymes); vs stone multiple etiologies possible - peptic ulcer (smoker, ETOH use, epigastic pain), gastroenteritis (given h/o vomiting and diarrhea), vs. acute on chronic pancreatitis (given epigastric pain radiating to back; unable to mount pancreatic enzymes); vs biliary colic 2/2 stone (pancreatic duct dilation on CT a/p).    -allergic to morphine and dilaudid caused redness at IV site; limited options for pain control due to Cre elevation and morphine allergy.  will c/w tylenol 650 q 6 unless very severe pain, will try tramadol  -LR @ 250 cc/hr  -ppi 40 IV bid   -clear liquid diet  -serial abd exams

## 2017-10-10 VITALS
TEMPERATURE: 98 F | DIASTOLIC BLOOD PRESSURE: 80 MMHG | HEART RATE: 61 BPM | RESPIRATION RATE: 17 BRPM | SYSTOLIC BLOOD PRESSURE: 122 MMHG | OXYGEN SATURATION: 99 %

## 2017-10-10 LAB
ALBUMIN SERPL ELPH-MCNC: 4 G/DL — SIGNIFICANT CHANGE UP (ref 3.3–5)
ALP SERPL-CCNC: 296 U/L — HIGH (ref 40–120)
ALT FLD-CCNC: 22 U/L — SIGNIFICANT CHANGE UP (ref 10–45)
ANION GAP SERPL CALC-SCNC: 16 MMOL/L — SIGNIFICANT CHANGE UP (ref 5–17)
AST SERPL-CCNC: 25 U/L — SIGNIFICANT CHANGE UP (ref 10–40)
BILIRUB SERPL-MCNC: 0.4 MG/DL — SIGNIFICANT CHANGE UP (ref 0.2–1.2)
BLD GP AB SCN SERPL QL: NEGATIVE — SIGNIFICANT CHANGE UP
BUN SERPL-MCNC: 43 MG/DL — HIGH (ref 7–23)
CA-I BLD-SCNC: 0.93 MMOL/L — LOW (ref 1.12–1.3)
CALCIUM SERPL-MCNC: 7 MG/DL — LOW (ref 8.4–10.5)
CHLORIDE SERPL-SCNC: 101 MMOL/L — SIGNIFICANT CHANGE UP (ref 96–108)
CK SERPL-CCNC: 456 U/L — HIGH (ref 30–200)
CO2 SERPL-SCNC: 17 MMOL/L — LOW (ref 22–31)
CREAT SERPL-MCNC: 5.42 MG/DL — HIGH (ref 0.5–1.3)
GLUCOSE BLDC GLUCOMTR-MCNC: 122 MG/DL — HIGH (ref 70–99)
GLUCOSE SERPL-MCNC: 312 MG/DL — HIGH (ref 70–99)
HCT VFR BLD CALC: 29.6 % — LOW (ref 39–50)
HGB BLD-MCNC: 9.7 G/DL — LOW (ref 13–17)
MAGNESIUM SERPL-MCNC: 1.8 MG/DL — SIGNIFICANT CHANGE UP (ref 1.6–2.6)
MCHC RBC-ENTMCNC: 28 PG — SIGNIFICANT CHANGE UP (ref 27–34)
MCHC RBC-ENTMCNC: 32.8 G/DL — SIGNIFICANT CHANGE UP (ref 32–36)
MCV RBC AUTO: 85.5 FL — SIGNIFICANT CHANGE UP (ref 80–100)
PLATELET # BLD AUTO: 157 K/UL — SIGNIFICANT CHANGE UP (ref 150–400)
POTASSIUM SERPL-MCNC: 6.3 MMOL/L — CRITICAL HIGH (ref 3.5–5.3)
POTASSIUM SERPL-SCNC: 6.3 MMOL/L — CRITICAL HIGH (ref 3.5–5.3)
PROT SERPL-MCNC: 7.7 G/DL — SIGNIFICANT CHANGE UP (ref 6–8.3)
RBC # BLD: 3.46 M/UL — LOW (ref 4.2–5.8)
RBC # FLD: 12.6 % — SIGNIFICANT CHANGE UP (ref 10.3–16.9)
RH IG SCN BLD-IMP: POSITIVE — SIGNIFICANT CHANGE UP
SODIUM SERPL-SCNC: 134 MMOL/L — LOW (ref 135–145)
WBC # BLD: 6.2 K/UL — SIGNIFICANT CHANGE UP (ref 3.8–10.5)
WBC # FLD AUTO: 6.2 K/UL — SIGNIFICANT CHANGE UP (ref 3.8–10.5)

## 2017-10-10 PROCEDURE — 83690 ASSAY OF LIPASE: CPT

## 2017-10-10 PROCEDURE — 84100 ASSAY OF PHOSPHORUS: CPT

## 2017-10-10 PROCEDURE — 86901 BLOOD TYPING SEROLOGIC RH(D): CPT

## 2017-10-10 PROCEDURE — 85027 COMPLETE CBC AUTOMATED: CPT

## 2017-10-10 PROCEDURE — 82150 ASSAY OF AMYLASE: CPT

## 2017-10-10 PROCEDURE — 74176 CT ABD & PELVIS W/O CONTRAST: CPT

## 2017-10-10 PROCEDURE — 82962 GLUCOSE BLOOD TEST: CPT

## 2017-10-10 PROCEDURE — 99239 HOSP IP/OBS DSCHRG MGMT >30: CPT

## 2017-10-10 PROCEDURE — 96374 THER/PROPH/DIAG INJ IV PUSH: CPT

## 2017-10-10 PROCEDURE — 99285 EMERGENCY DEPT VISIT HI MDM: CPT | Mod: 25

## 2017-10-10 PROCEDURE — 82330 ASSAY OF CALCIUM: CPT

## 2017-10-10 PROCEDURE — 96375 TX/PRO/DX INJ NEW DRUG ADDON: CPT

## 2017-10-10 PROCEDURE — 83735 ASSAY OF MAGNESIUM: CPT

## 2017-10-10 PROCEDURE — 80053 COMPREHEN METABOLIC PANEL: CPT

## 2017-10-10 PROCEDURE — 86850 RBC ANTIBODY SCREEN: CPT

## 2017-10-10 PROCEDURE — 80061 LIPID PANEL: CPT

## 2017-10-10 PROCEDURE — 36415 COLL VENOUS BLD VENIPUNCTURE: CPT

## 2017-10-10 PROCEDURE — 96372 THER/PROPH/DIAG INJ SC/IM: CPT | Mod: XU

## 2017-10-10 PROCEDURE — 82550 ASSAY OF CK (CPK): CPT

## 2017-10-10 PROCEDURE — 86900 BLOOD TYPING SEROLOGIC ABO: CPT

## 2017-10-10 PROCEDURE — 93005 ELECTROCARDIOGRAM TRACING: CPT

## 2017-10-10 PROCEDURE — 71046 X-RAY EXAM CHEST 2 VIEWS: CPT

## 2017-10-10 RX ORDER — HYDROMORPHONE HYDROCHLORIDE 2 MG/ML
0 INJECTION INTRAMUSCULAR; INTRAVENOUS; SUBCUTANEOUS
Qty: 0 | Refills: 0 | COMMUNITY

## 2017-10-10 RX ORDER — FOLIC ACID 0.8 MG
1 TABLET ORAL
Qty: 0 | Refills: 0 | COMMUNITY
Start: 2017-10-10

## 2017-10-10 RX ORDER — ACETAMINOPHEN 500 MG
2 TABLET ORAL
Qty: 0 | Refills: 0 | COMMUNITY
Start: 2017-10-10

## 2017-10-10 RX ORDER — SEVELAMER CARBONATE 2400 MG/1
1 POWDER, FOR SUSPENSION ORAL
Qty: 0 | Refills: 0 | COMMUNITY
Start: 2017-10-10

## 2017-10-10 RX ORDER — CHOLECALCIFEROL (VITAMIN D3) 125 MCG
0 CAPSULE ORAL
Qty: 0 | Refills: 0 | COMMUNITY
Start: 2017-10-10

## 2017-10-10 RX ORDER — LIPASE/PROTEASE/AMYLASE 16-48-48K
3 CAPSULE,DELAYED RELEASE (ENTERIC COATED) ORAL
Qty: 0 | Refills: 0 | COMMUNITY
Start: 2017-10-10

## 2017-10-10 RX ORDER — HYDROMORPHONE HYDROCHLORIDE 2 MG/ML
0.5 INJECTION INTRAMUSCULAR; INTRAVENOUS; SUBCUTANEOUS ONCE
Qty: 0 | Refills: 0 | Status: DISCONTINUED | OUTPATIENT
Start: 2017-10-10 | End: 2017-10-10

## 2017-10-10 RX ORDER — DIPHENHYDRAMINE HCL 50 MG
25 CAPSULE ORAL ONCE
Qty: 0 | Refills: 0 | Status: COMPLETED | OUTPATIENT
Start: 2017-10-10 | End: 2017-10-10

## 2017-10-10 RX ORDER — LIPASE/PROTEASE/AMYLASE 16-48-48K
0 CAPSULE,DELAYED RELEASE (ENTERIC COATED) ORAL
Qty: 0 | Refills: 0 | COMMUNITY

## 2017-10-10 RX ADMIN — HYDROMORPHONE HYDROCHLORIDE 0.5 MILLIGRAM(S): 2 INJECTION INTRAMUSCULAR; INTRAVENOUS; SUBCUTANEOUS at 02:28

## 2017-10-10 RX ADMIN — HYDROMORPHONE HYDROCHLORIDE 0.5 MILLIGRAM(S): 2 INJECTION INTRAMUSCULAR; INTRAVENOUS; SUBCUTANEOUS at 13:40

## 2017-10-10 RX ADMIN — Medication 100 MILLIGRAM(S): at 01:58

## 2017-10-10 RX ADMIN — Medication 25 MILLIGRAM(S): at 13:32

## 2017-10-10 RX ADMIN — HYDROMORPHONE HYDROCHLORIDE 0.5 MILLIGRAM(S): 2 INJECTION INTRAMUSCULAR; INTRAVENOUS; SUBCUTANEOUS at 01:58

## 2017-10-10 RX ADMIN — HYDROMORPHONE HYDROCHLORIDE 0.5 MILLIGRAM(S): 2 INJECTION INTRAMUSCULAR; INTRAVENOUS; SUBCUTANEOUS at 13:25

## 2017-10-10 NOTE — DISCHARGE NOTE ADULT - CARE PLAN
Principal Discharge DX:	Chronic pancreatitis, unspecified pancreatitis type  Goal:	Resolution of pancreatitis  Instructions for follow-up, activity and diet:	You had likely pancreatitis, and refused examination and lab draws for your hospital stay. You left against medical advice with risks, including death, explained to you. You refused IV fluids and other treatments offered by the medical team to attempt to diagnose and treat you  Secondary Diagnosis:	Hypocalcemia  Goal:	Normocalcemia  Instructions for follow-up, activity and diet:	You have low calcium and we attempted to replete your calcium. You left against medical advice with risks, including death, explained to you  Secondary Diagnosis:	Renal failure  Goal:	Delay progression  Instructions for follow-up, activity and diet:	You were admitted with renal failure, and we were unable to obtain a baseline kidney function due to lack of cooperation with medical questioning and examination. You left against medical advice with risks, including death, explained to you  Secondary Diagnosis:	Hematemesis, presence of nausea not specified  Goal:	Resolution of hematemesis  Instructions for follow-up, activity and diet:	You had bloody episodes of emesis, resolved during your admission. You left against medical advice with risks, including death, explained to you  Secondary Diagnosis:	Epigastric pain  Goal:	Resolution of pain  Instructions for follow-up, activity and diet:	Left against medical advice prior to evaluation and treatment

## 2017-10-10 NOTE — DISCHARGE NOTE ADULT - MEDICATION SUMMARY - MEDICATIONS TO TAKE
I will START or STAY ON the medications listed below when I get home from the hospital:    acetaminophen 325 mg oral tablet  -- 2 tab(s) by mouth every 6 hours, As needed, Moderate Pain (4 - 6)  -- Indication: For Epigastric pain    calcium carbonate  -- Indication: For Hypocalcemia    NovoLOG 100 units/mL subcutaneous solution  -- Indication: For Diabetes    Lantus 100 units/mL subcutaneous solution  -- 12 unit(s) subcutaneous once a day (at bedtime)  -- Indication: For Diabetes    pancrelipase 12,000 units-38,000 units-60,000 units oral delayed release capsule  -- 3 cap(s) by mouth 3 times a day (with meals)  -- Indication: For Pancreatitis    sevelamer hydrochloride 800 mg oral tablet  -- 1 tab(s) by mouth 3 times a day (with meals)  -- Indication: For Creatinine elevation    Multiple Vitamins oral tablet  -- 1 tab(s) by mouth once a day  -- Indication: For Prophylactic measure    cholecalciferol 400 intl units/mL oral liquid  --  by mouth   -- Indication: For Prophylactic measure    folic acid 1 mg oral tablet  -- 1 tab(s) by mouth once a day  -- Indication: For Prophylactic measure    Vitamin D3  -- Indication: For Prophylactic measure

## 2017-10-10 NOTE — CHART NOTE - NSCHARTNOTEFT_GEN_A_CORE
Patient left AMA    Patient refused examination in the morning, and through most of the afternoon. Refused IV access, yelled at team and staff throughout the day, demanded IV narcotic pain medication and refused fluids when IV was placed. Ate solid food despite clear liquid diet and abdominal pain worsened. Repeat BMP showed K+ 6.3, highly concerning in setting of renal failure. Left AMA prior to treatment and with risks and absolute urgency of treatment explained, including death. Patient warned of high probability of medical emergency should he leave hospital without proper treatment. Left AMA without signing paperwork.

## 2017-10-10 NOTE — PROGRESS NOTE ADULT - PROBLEM SELECTOR PLAN 1
- Limited assessment and patient refusing labs and vitals  - Could be due to peptic ulcer (smoker, ETOH use, epigastic pain), gastroenteritis (given h/o vomiting and diarrhea), acute on chronic pancreatitis (given epigastric pain radiating to back; unable to mount pancreatic enzymes), biliary colic 2/2 stone (pancreatic duct dilation on CT)  - allergic to morphine and dilaudid caused redness at IV site; limited options for pain control due to Cre elevation and morphine allergy.  will c/w tylenol 650 q 6 unless very severe pain, gave 0.5mg IV dilaudid this afternoon  -LR @ 250 cc/hr, patient refused IV access overnight, acquiesced to IV this afternoon  -ppi 40 IV bid   -clear liquid diet  -serial abd exams if patient allows

## 2017-10-10 NOTE — DISCHARGE NOTE ADULT - ADDITIONAL INSTRUCTIONS
Please follow up with [facility] or a provider. You left against medical advice and without signing an AMA form

## 2017-10-10 NOTE — DISCHARGE NOTE ADULT - PLAN OF CARE
Resolution of pancreatitis You had likely pancreatitis, and refused examination and lab draws for your hospital stay. You left against medical advice with risks, including death, explained to you. You refused IV fluids and other treatments offered by the medical team to attempt to diagnose and treat you Normocalcemia You have low calcium and we attempted to replete your calcium. You left against medical advice with risks, including death, explained to you Delay progression You were admitted with renal failure, and we were unable to obtain a baseline kidney function due to lack of cooperation with medical questioning and examination. You left against medical advice with risks, including death, explained to you Resolution of hematemesis You had bloody episodes of emesis, resolved during your admission. You left against medical advice with risks, including death, explained to you Resolution of pain Left against medical advice prior to evaluation and treatment

## 2017-10-10 NOTE — CHART NOTE - NSCHARTNOTEFT_GEN_A_CORE
pt reported pain and refused to receive IVF unless he had pain relief.  Was given 0.25 mg iv dilaudid x1 after risks of allergic rxn, including sob, anaphylaxis, death cautioned.  Pt understood.  Received dilaudid and then reporting itching and requested benadryl.  Also did refused to receive IVF once again.  Refusing lantus and ISS.  Also refused lab draws.

## 2017-10-10 NOTE — PROGRESS NOTE ADULT - SUBJECTIVE AND OBJECTIVE BOX
OVERNIGHT EVENTS: Admitted, refused labs in am, refused vital signs in am. Irate with staff.     SUBJECTIVE / INTERVAL HPI: Patient seen and examined at bedside. Patient declined to participate in ROS. Denies chest pain and SOB. Admits to abdominal pain.     Review of systems negative except as noted above.     VITAL SIGNS:  Vital Signs Last 24 Hrs  T(C): 36.7 (10 Oct 2017 06:42), Max: 36.7 (10 Oct 2017 06:42)  T(F): 98 (10 Oct 2017 06:42), Max: 98 (10 Oct 2017 06:42)  HR: 58 (10 Oct 2017 06:42) (57 - 62)  BP: 116/68 (10 Oct 2017 06:42) (116/68 - 136/85)  BP(mean): --  RR: 18 (10 Oct 2017 06:42) (16 - 19)  SpO2: 98% (10 Oct 2017 06:42) (98% - 100%)      10-09-17 @ 07:01  -  10-10-17 @ 07:00  --------------------------------------------------------  IN: 0 mL / OUT: 700 mL / NET: -700 mL        PHYSICAL EXAM: largely deferred due to patient with covers over head and shouting angrily.     General: WDWN M, irate  HEENT: NC/AT  Neck: Deferred  Cardiovascular: +S1/S2; RR. Further evaluation deferred.   Respiratory: per anterior exam CTAB, posterior exam deferred  Gastrointestinal: Diffuse severe TTP  Neurological: Alert, unable to assessed orientation; no focal deficits appreciated though exam limited by patient refusal    MEDICATIONS:  MEDICATIONS  (STANDING):  amylase/lipase/protease  (CREON 12,000 Units) 3 Capsule(s) Oral three times a day with meals  calcium carbonate 1250 mG (OsCal) 1 Tablet(s) Oral daily  cholecalciferol Oral Liquid - Peds 1000 Unit(s) Oral daily  dextrose 5%. 1000 milliLiter(s) (50 mL/Hr) IV Continuous <Continuous>  dextrose 50% Injectable 12.5 Gram(s) IV Push once  dextrose 50% Injectable 25 Gram(s) IV Push once  dextrose 50% Injectable 25 Gram(s) IV Push once  folic acid 1 milliGRAM(s) Oral daily  insulin glargine Injectable (LANTUS) 5 Unit(s) SubCutaneous at bedtime  insulin lispro (HumaLOG) corrective regimen sliding scale   SubCutaneous Before meals and at bedtime  lactated ringers. 1000 milliLiter(s) (250 mL/Hr) IV Continuous <Continuous>  multivitamin 1 Tablet(s) Oral daily  pantoprazole  Injectable 40 milliGRAM(s) IV Push every 12 hours  sevelamer hydrochloride 800 milliGRAM(s) Oral three times a day with meals    MEDICATIONS  (PRN):  acetaminophen   Tablet. 650 milliGRAM(s) Oral every 6 hours PRN Moderate Pain (4 - 6)  dextrose Gel 1 Dose(s) Oral once PRN Blood Glucose LESS THAN 70 milliGRAM(s)/deciliter  glucagon  Injectable 1 milliGRAM(s) IntraMuscular once PRN Glucose LESS THAN 70 milligrams/deciliter      ALLERGIES:  Allergies    morphine (Hives)  Omnipaque 140 (Hives)    Intolerances        LABS:                        8.5    5.3   )-----------( 151      ( 09 Oct 2017 15:49 )             25.7     10-09    135  |  100  |  32<H>  ----------------------------<  208<H>  5.0   |  17<L>  |  5.15<H>    Ca    7.1<L>      09 Oct 2017 15:49  Phos  5.7     10-09  Mg     1.9     10-09    TPro  7.0  /  Alb  3.7  /  TBili  0.2  /  DBili  x   /  AST  27  /  ALT  23  /  AlkPhos  287<H>  10-09        CAPILLARY BLOOD GLUCOSE  104 (10 Oct 2017 07:34)      POCT Blood Glucose.: 122 mg/dL (10 Oct 2017 12:14)    CARDIAC MARKERS ( 09 Oct 2017 15:49 )  x     / x     / 577 U/L / x     / x            RADIOLOGY & ADDITIONAL TESTS: Reviewed.

## 2017-10-10 NOTE — DISCHARGE NOTE ADULT - HOSPITAL COURSE
53 yo M PMH chronic pancreatitis s/p Whipple 2009 for pseudocysts, DM on insulin, hypoparathyroidism s/p parathyroid sx 8/17, CKD p/w abd pain, vomiting, and pain in his LE.  Pt was sleeping when arrived at bedside and abruptly became belligerent upon asking questions, not wanting to answer questions and stating that staff has been ignoring him while he is in pain; therefore history and exam are limited.  Some history obtained per ICU chart note.  States the abd pain started yesterday, throbbing, radiating to back, constant.  Around the same time, started vomiting x 5, yellow material.  Did not note blood initially when he began vomiting, but during subsequent episodes did not blood.  Per ICU, also has been having nonbloody diarrhea.  Prior to pain and vomiting, drank 3 beers the day before.  Smokes 3 cig/d.   Had been having LE pain due to muscle spasms and tingling in his fingers and toes since his parathyroid surgery in August 2017, states 1 parathyroid gland was left.  Has been taking vit D and Ca supp daily since then.  Also stated he is in kidney failure, but still urinates, amount unchanged.    In the ED, VSS.  Labs significant for bicarb 17, AG 18, BUN/Cre 32/5.15.  CT a/p showed chronic pancreatitis with pancreatic duct dilation.  EKG showing sinus victoria, QTC wnl.  Given 1 g Cagluconate, after which pt states the bag was "so little", and he is still having tingling.  Given 0.5 IV hydromorphone, after which IV site started itching, so pt requested benadryl per ED attg.  Also given 1 L NS x 2, zofran 4 mg IV x 1, and tylenol 1 g IV x 1.  ED did not report vomiting during adm.  ICU consulted due to symptomatic hypocalcemia; pt deemed appropriate for GMF given stable EKG.  Admitted for pain control, hypocalcemia symptoms.    Patient refused examination in the morning, and through most of the afternoon. Refused IV access, yelled at team and staff throughout the day, demanded IV narcotic pain medication and refused fluids when IV was placed. Ate solid food despite clear liquid diet and abdominal pain worsened. Repeat BMP showed K+ 6.3, highly concerning in setting of renal failure. Left AMA prior to treatment and with risks and absolute urgency of treatment explained, including death. Patient warned of high probability of medical emergency should he leave hospital without proper treatment. Left AMA without signing paperwork. 53 yo M PMH chronic pancreatitis s/p Whipple 2009 for pseudocysts, DM on insulin, hypoparathyroidism s/p parathyroid sx 8/17, CKD p/w abd pain, vomiting, and pain in his LE.  Pt was sleeping when arrived at bedside and abruptly became belligerent upon asking questions, not wanting to answer questions and stating that staff has been ignoring him while he is in pain; therefore history and exam are limited.  Some history obtained per ICU chart note.  States the abd pain started yesterday, throbbing, radiating to back, constant.  Around the same time, started vomiting x 5, yellow material.  Did not note blood initially when he began vomiting, but during subsequent episodes did not blood.  Per ICU, also has been having nonbloody diarrhea.  Prior to pain and vomiting, drank 3 beers the day before.  Smokes 3 cig/d.   Had been having LE pain due to muscle spasms and tingling in his fingers and toes since his parathyroid surgery in August 2017, states 1 parathyroid gland was left.  Has been taking vit D and Ca supp daily since then.  Also stated he is in kidney failure, but still urinates, amount unchanged.    In the ED, VSS.  Labs significant for bicarb 17, AG 18, BUN/Cre 32/5.15.  CT a/p showed chronic pancreatitis with pancreatic duct dilation.  EKG showing sinus victoria, QTC wnl.  Given 1 g Cagluconate, after which pt states the bag was "so little", and he is still having tingling.  Given 0.5 IV hydromorphone, after which IV site started itching, so pt requested benadryl per ED attg.  Also given 1 L NS x 2, zofran 4 mg IV x 1, and tylenol 1 g IV x 1.  ED did not report vomiting during adm.  ICU consulted due to symptomatic hypocalcemia; pt deemed appropriate for GMF given stable EKG.  Admitted for pain control, hypocalcemia symptoms.    Patient refused examination in the morning, and through most of the afternoon. Refused IV access, yelled at team and staff throughout the day, demanded IV narcotic pain medication - specifically Dilaudid - and refused fluids when IV was placed. Ate solid food despite clear liquid diet and abdominal pain worsened. Repeat BMP showed K+ 6.3, highly concerning in setting of renal failure. EKG performed without evidence of peaked T waves. Left AMA prior to treatment and with risks and absolute urgency of treatment explained, including death. Patient warned of high probability of medical emergency should he leave hospital without proper treatment, however he refused to listen. Left AMA and refused to sign AMA form, which was placed in chart.

## 2017-10-10 NOTE — PROGRESS NOTE ADULT - ATTENDING COMMENTS
Patient was seen and examined by me at bedside. I agree with resident's note, subjective, objective physical exam, assessment and plan with following modifications/additions.     1) Abdominal pain, epigastric  2) Chronic pancreatitis  3) Anion GAP metabolic acidosis 2/2 uremia  4) Severe hyperkalemia  5) CAROL ANN likely intrinsic, ATN?  6) CKD, unknown baseline.   7) EtOH abuse  8) Hypocalcemia 2/2 hyperthyroidism.     Patient was refusing labs twice today, as well as IVF and physical examination. Only requesting pain meds.   Plan: Aggressive IVF hydration, insulin 10units stat, calcium gluconate. Repeat BMP after hyperkalemia treatment. Dilaudid 0.5mg prn for pain. Renal consult.

## 2017-10-10 NOTE — PROGRESS NOTE ADULT - PROBLEM SELECTOR PLAN 5
- Possible that patient may have thyroid abnormality now after parathyroid surgery  - Repeat EKG  - F/u TSH

## 2017-10-10 NOTE — PROGRESS NOTE ADULT - PROBLEM SELECTOR PLAN 3
- Per documentation, patient states baseline creatinine 2.0. Will discuss prior medical care with patient and obtain collateral   - Kidneys normal on CT AP. BP controlled   - F/u urine studies, UA, lactate  - IVF 250cc/hr in setting of likely dehydration  - F/u abdominal U/S

## 2017-10-10 NOTE — PROGRESS NOTE ADULT - PROBLEM SELECTOR PLAN 2
- Corrected Ca 7.3.  QTc 449 on EKG  - s/p 1g Calcium gluconate  - Unknown if patient still symptomatic given declining exam/ROS  - Etiology likely iatrogenic hypoparathyroidism  - Recheck ionized Ca  - 1 g IV mag  - Check PTH, vit D-25 OH, vit-D1,25 given CKD  - Sevelamer tid, calcium carbonate, vit D supplementation  - Discussed critical importance of lab draws with patient, warned of risks of refusal up to and including death

## 2017-10-10 NOTE — PROGRESS NOTE ADULT - PROBLEM SELECTOR PLAN 10
ETOH use- monitor CIWA.  thiamine 100 mg IV x 1 now.  folate, thiamine, multivitamin daily    Code Full  Dispo RMF

## 2017-10-10 NOTE — DISCHARGE NOTE ADULT - PATIENT PORTAL LINK FT
“You can access the [facility] Patient Portal, offered by [facility], by registering with the following website: [email or web address]”

## 2017-10-10 NOTE — PROGRESS NOTE ADULT - ASSESSMENT
52M with PMH chronic pancreatitis s/p Whipple 2009 for pseudocysts, DM on insulin, hypoparathyroidism s/p parathyroidectomy 8/17, CKD p/w abd pain, vomiting, hypocalcemia in the setting of recent ETOH use and recent parathyroid surgery.

## 2017-10-10 NOTE — PROGRESS NOTE ADULT - PROBLEM SELECTOR PLAN 4
- Likely a moreno-sunshine tear due to repeated vomiting vs. peptic ulcer; no signs of active bleeding  - Monitor CBC, transfusion goal >7  - Protonix 40 IV bid  - FOBT

## 2017-10-10 NOTE — PROGRESS NOTE ADULT - PROBLEM SELECTOR PLAN 7
- Renal failure vs. hematemesis vs. EtOH use  - F/u Fe studies, folate, b12, retic %, peripheral smear

## 2017-10-10 NOTE — DISCHARGE NOTE ADULT - CARE PROVIDER_API CALL
Karis Torres (), [facility]  [address] [place] [address]  Phone: [phone]  Fax: [phone]

## 2017-10-15 DIAGNOSIS — K92.0 HEMATEMESIS: ICD-10-CM

## 2017-10-15 DIAGNOSIS — Z72.89 OTHER PROBLEMS RELATED TO LIFESTYLE: ICD-10-CM

## 2017-10-15 DIAGNOSIS — K85.90 ACUTE PANCREATITIS WITHOUT NECROSIS OR INFECTION, UNSPECIFIED: ICD-10-CM

## 2017-10-15 DIAGNOSIS — K86.1 OTHER CHRONIC PANCREATITIS: ICD-10-CM

## 2017-10-15 DIAGNOSIS — D63.8 ANEMIA IN OTHER CHRONIC DISEASES CLASSIFIED ELSEWHERE: ICD-10-CM

## 2017-10-15 DIAGNOSIS — R00.1 BRADYCARDIA, UNSPECIFIED: ICD-10-CM

## 2017-10-15 DIAGNOSIS — N18.9 CHRONIC KIDNEY DISEASE, UNSPECIFIED: ICD-10-CM

## 2017-10-15 DIAGNOSIS — E83.51 HYPOCALCEMIA: ICD-10-CM

## 2017-10-15 DIAGNOSIS — E87.2 ACIDOSIS: ICD-10-CM

## 2017-10-15 DIAGNOSIS — E87.5 HYPERKALEMIA: ICD-10-CM

## 2017-10-15 DIAGNOSIS — N17.9 ACUTE KIDNEY FAILURE, UNSPECIFIED: ICD-10-CM

## 2017-10-15 DIAGNOSIS — E55.9 VITAMIN D DEFICIENCY, UNSPECIFIED: ICD-10-CM

## 2017-10-15 DIAGNOSIS — E20.9 HYPOPARATHYROIDISM, UNSPECIFIED: ICD-10-CM

## 2017-10-15 DIAGNOSIS — E11.22 TYPE 2 DIABETES MELLITUS WITH DIABETIC CHRONIC KIDNEY DISEASE: ICD-10-CM

## 2018-02-13 NOTE — DISCHARGE NOTE ADULT - INSTRUCTIONS
Please eat low potassium diet, with complex carbohydrates
I will STOP taking the medications listed below when I get home from the hospital:    oxyCODONE

## 2021-10-27 NOTE — H&P ADULT - PROBLEM SELECTOR PLAN 2
no zaira Ca 7.3.  QTc 449 on EKG; however looking at EKG, less than half RR interval.  still symptomatic despite 1 g Ca gluc.  etiology likely combination of post-surgical hypoparathyroidism and CKD (secondary hypoparathyroidism from high phos, low Ca)  -recheck ionized Ca  -give 1 g IV mag  -check PTH, vit D-25 OH, vit-D1,25 given CKD  -sevelamer tid zaira Ca 7.3.  QTc 449 on EKG; however looking at EKG, less than half RR interval.  still symptomatic despite 1 g Ca gluc.  etiology likely combination of post-surgical hypoparathyroidism and CKD (secondary hypoparathyroidism from high phos, low Ca)  -recheck ionized Ca  -give 1 g IV mag  -check PTH, vit D-25 OH, vit-D1,25 given CKD  -sevelamer tid, ca carb, vit D supplementation

## 2022-08-31 NOTE — PROGRESS NOTE ADULT - MINUTES
50 Continue Regimen: Isotretinoin 80mg QD\\nAquaphor QD on lips Detail Level: Zone Render In Strict Bullet Format?: No